# Patient Record
Sex: MALE | Race: BLACK OR AFRICAN AMERICAN | NOT HISPANIC OR LATINO | Employment: FULL TIME | URBAN - METROPOLITAN AREA
[De-identification: names, ages, dates, MRNs, and addresses within clinical notes are randomized per-mention and may not be internally consistent; named-entity substitution may affect disease eponyms.]

---

## 2017-04-06 ENCOUNTER — HOSPITAL ENCOUNTER (OUTPATIENT)
Dept: RADIOLOGY | Facility: HOSPITAL | Age: 42
Discharge: HOME | End: 2017-04-06
Attending: PODIATRIST
Payer: COMMERCIAL

## 2017-04-06 ENCOUNTER — OFFICE VISIT (OUTPATIENT)
Dept: PODIATRY | Facility: CLINIC | Age: 42
End: 2017-04-06
Payer: COMMERCIAL

## 2017-04-06 VITALS
SYSTOLIC BLOOD PRESSURE: 135 MMHG | HEIGHT: 72 IN | DIASTOLIC BLOOD PRESSURE: 85 MMHG | HEART RATE: 86 BPM | BODY MASS INDEX: 37.32 KG/M2 | WEIGHT: 275.56 LBS

## 2017-04-06 DIAGNOSIS — M76.821 POSTERIOR TIBIAL TENDINITIS, RIGHT: Primary | ICD-10-CM

## 2017-04-06 DIAGNOSIS — M21.41 PES PLANUS OF BOTH FEET: ICD-10-CM

## 2017-04-06 DIAGNOSIS — M25.571 RIGHT ANKLE PAIN, UNSPECIFIED CHRONICITY: ICD-10-CM

## 2017-04-06 DIAGNOSIS — M25.571 RIGHT ANKLE PAIN, UNSPECIFIED CHRONICITY: Primary | ICD-10-CM

## 2017-04-06 DIAGNOSIS — M21.42 PES PLANUS OF BOTH FEET: ICD-10-CM

## 2017-04-06 PROCEDURE — 73610 X-RAY EXAM OF ANKLE: CPT | Mod: 26,RT,, | Performed by: RADIOLOGY

## 2017-04-06 PROCEDURE — 99204 OFFICE O/P NEW MOD 45 MIN: CPT | Mod: S$GLB,,, | Performed by: PODIATRIST

## 2017-04-06 PROCEDURE — 99999 PR PBB SHADOW E&M-EST. PATIENT-LVL III: CPT | Mod: PBBFAC,,, | Performed by: PODIATRIST

## 2017-04-06 PROCEDURE — 73610 X-RAY EXAM OF ANKLE: CPT | Mod: TC,PO,RT

## 2017-04-06 PROCEDURE — 1160F RVW MEDS BY RX/DR IN RCRD: CPT | Mod: S$GLB,,, | Performed by: PODIATRIST

## 2017-04-06 RX ORDER — METHYLPREDNISOLONE 4 MG/1
4 TABLET ORAL DAILY
Qty: 1 PACKAGE | Refills: 0 | Status: SHIPPED | OUTPATIENT
Start: 2017-04-06 | End: 2017-05-24

## 2017-04-06 NOTE — MR AVS SNAPSHOT
[facility]  [address] [place] [address]  Phone: [phone]  Fax: [phone]                  Oscar Lemus   2017 3:00 PM   Office Visit    Description:  Male : 1975   Provider:  Rose Purdy DPM   Department:  [facility]           Reason for Visit     Ankle Pain           Diagnoses this Visit        Comments    Posterior tibial tendinitis, right    -  Primary            To Do List           Future Appointments        Provider Department Dept Phone    2017  4:30 PM [facility] XR5 [facility] [phone]    2017 2:40 PM [facility] [phone]      Goals (5 Years of Data)     None       These Medications        Disp Refills Start End    methylPREDNISolone (MEDROL DOSEPACK) 4 mg tablet 1 Package 0 2017     Take 1 tablet (4 mg total) by mouth once daily. Use as instructed on dose pack - Oral    Pharmacy: [facility] [id number]  [place], [place] - [address] [address] AT [facility] [facility] Ph #: [phone]         [facility]     [facility] Nurse Care Line -  Assistance  Unless otherwise directed by your provider, please contact [facility], our nurse care line that is available for  assistance.     Registered nurses in the [facility] provide: appointment scheduling, clinical advisement, health education, and other advisory services.  Call: [phone] (toll free)               Medications           Message regarding Medications     Verify the changes and/or additions to your medication regime listed below are the same as discussed with your clinician today.  If any of these changes or additions are incorrect, please notify your healthcare provider.        START taking these NEW medications        Refills    methylPREDNISolone (MEDROL DOSEPACK) 4 mg tablet 0    Sig: Take 1 tablet (4 mg total) by mouth once daily. Use as instructed on dose pack    Class: Normal     Route: Oral           Verify that the below list of medications is an accurate representation of the medications you are currently taking.  If none reported, the list may be blank. If incorrect, please contact your healthcare provider. Carry this list with you in case of emergency.           Current Medications     methylPREDNISolone (MEDROL DOSEPACK) 4 mg tablet Take 1 tablet (4 mg total) by mouth once daily. Use as instructed on dose pack           Clinical Reference Information           Your Vitals Were     BP Pulse Height Weight BMI    135/85 (BP Location: Right arm, Patient Position: Sitting, BP Method: Automatic) 86 6' (1.829 m) 125 kg (275 lb 9.2 oz) 37.37 kg/m2      Blood Pressure          Most Recent Value    BP  135/85      Allergies as of 4/6/2017     Cephalosporins    Pcn [Penicillins]    Rocephin [Ceftriaxone]    Codeine    Ibuprofen      Immunizations Administered on Date of Encounter - 4/6/2017     None      [facility] Sign-Up     Activating your [facility] account is as easy as 1-2-3!     1) Visit [email or web address], select Sign Up Now, enter this activation code and your date of birth, then select Next.  [email or web address]  Expires: 5/21/2017  4:29 PM      2) Create a username and password to use when you visit [facility] in the future and select a security question in case you lose your password and select Next.    3) Enter your e-mail address and click Sign Up!    Additional Information  If you have questions, please e-mail [email or web address] or call [phone] to talk to our [facility] staff. Remember, [facility] is NOT to be used for urgent needs. For medical emergencies, dial 911.         Language Assistance Services     ATTENTION: Language assistance services are available, free of charge. Please call [phone].      ATENCIÓN: Si albala heraclio, tiene a henry disposición servicios gratuitos de asistencia lingüística. Llame al [phone].     CHÚ Ý: N?u b?n nói Ti?ng Vi?t, có các d?ch v? h?  tr? erica jarvis? mi?n phí dành cho b?n. G?i s? [phone].         [facility] complies with applicable Federal civil rights laws and does not discriminate on the basis of race, color, national origin, age, disability, or sex.

## 2017-04-06 NOTE — PROGRESS NOTES
Subjective:     Patient ID: Oscar Lemus is a 41 y.o. male.    Chief Complaint: Ankle Pain (Right ankle pain (medial side/there is swelling-no injury). States no current pain but when the pain does occur it throbs.)    Oscar is a 41 y.o. male who presents to the podiatry clinic  with complaint of  right foot pain. Onset of the symptoms was several weeks ago. Precipitating event: none known. Current symptoms include: ability to bear weight, but with some pain. Aggravating factors: running, squatting, standing and walking. Symptoms have gradually worsened. Patient has had no prior foot problems. Evaluation to date: none. Treatment to date: none. Patients rates pain 3/10 on pain scale. 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       There is no problem list on file for this patient.      Medication List with Changes/Refills   New Medications    METHYLPREDNISOLONE (MEDROL DOSEPACK) 4 MG TABLET    Take 1 tablet (4 mg total) by mouth once daily. Use as instructed on dose pack       Review of patient's allergies indicates:   Allergen Reactions    Cephalosporins Anaphylaxis    Pcn [penicillins] Anaphylaxis    Rocephin [ceftriaxone] Anaphylaxis    Codeine      HALLUCINATES    Ibuprofen Other (See Comments)       Past Surgical History:   Procedure Laterality Date    KNEE ARTHROSCOPY Right        History reviewed. No pertinent family history.    Social History     Social History    Marital status:      Spouse name: N/A    Number of children: N/A    Years of education: N/A     Occupational History    Not on file.     Social History Main Topics    Smoking status: Former Smoker     Types: Cigars    Smokeless tobacco: Not on file      Comment: Cigar smoker (previously)    Alcohol use Not on file    Drug use: Not on file    Sexual activity: Not on file     Other Topics Concern    Not on file     Social History Narrative       Vitals:     04/06/17 1544   BP: 135/85   Pulse: 86   Weight: 125 kg (275 lb 9.2 oz)   Height: 6' (1.829 m)   PainSc: 0-No pain       Review of Systems   Constitutional: Negative for chills and fever.   Respiratory: Negative for shortness of breath.    Cardiovascular: Negative for chest pain, palpitations, orthopnea, claudication and leg swelling.   Gastrointestinal: Negative for diarrhea, nausea and vomiting.   Musculoskeletal: Negative for joint pain.   Skin: Negative for rash.   Neurological: Negative for dizziness, tingling, sensory change, focal weakness and weakness.   Psychiatric/Behavioral: Negative.              Objective:       PHYSICAL EXAM: Apperance: Alert and orient in no distress,well developed, and with good attention to grooming and body habits  Patient presents ambulating in tennis shoes  Lower Extremity Physical Exam:  VASCULAR: Dorsalis pedis pulses 2/4 bilateral and Posterior Tibial pulses 2/4 bilateral. Capillary fill time <4 seconds bilateral. Mild edema observed right. Varicosities absent bilateral. Skin temperature of the lower extremities is warm to warm, proximal to distal. Hair growth WNL bilateral.  DERMATOLOGICAL: No skin rashes, subcutaneous nodules, lesions, or ulcers observed bilateral.  NEUROLOGICAL: Light touch, sharp-dull, proprioception all present and equal bilaterally.    MUSCULOSKELETAL: Muscle strength is 5/5 for foot inverters, everters, plantarflexors, and dorsiflexors. Muscle tone is normal. (+) tenderness on palpation of right lateral ankle at the ATFL and CFL, and along posterior tibial tendon. Pes planus foot type noted bilateral.     TEST RESULTS: Radiographs of right ankle reveals nonspecific soft tissue edema noted along the medial margins of the ankle.  No acute fractures or dislocations visualized. Ankle mortise is well-maintained. Dorsal calcaneal enthesophyte noted.        Assessment:       Encounter Diagnoses   Name Primary?    Posterior tibial tendinitis, right Yes    Pes  planus of both feet          Plan:   Posterior tibial tendinitis, right  -     methylPREDNISolone (MEDROL DOSEPACK) 4 mg tablet; Take 1 tablet (4 mg total) by mouth once daily. Use as instructed on dose pack  Dispense: 1 Package; Refill: 0    Pes planus of both feet      I counseled the patient on his conditions, their implications and medical management.  Reviewed x-rays in exam room with patient.   The patient and I reviewed the types of shoes he should be wearing, my recommendation includes generally the best time of the day for a shoe fitting is the afternoon, shoes with a wide toe box, very good cushion, and tennis shoes with removable inner soles. The patient and I reviewed my recommendations for over-the-counter orthotic inserts.  Patient was fitted with lace up ankle brace and instructed on proper usage. This should be worn daily for a minimum of 4 weeks at all times when ambulating.  Patient instructed on adequate icing techniques. Patient should ice the affected area at least once per day x 10 minutes for 10 days . I advised the  patient that extra icing would also be beneficial to ensure adequate anti inflammatory effect.   Prescribed Medrol Dosepak to be taken as directed on package. Discussed possible increase in blood sugar with taking steroid medication. Patient advised on the possible elevation of blood pressure sugar and caution to take pills as prescribed and to discontinue use if symptoms arise, patient agreed.  Patient to refrain from any running activities for 4 weeks.   I gave written and verbal instructions on heel cord stretching and this was demonstrated for the patient. Patient expressed understanding.  Patient to return in 1 month.           Rose Purdy DPM  [facility]

## 2017-05-24 ENCOUNTER — OFFICE VISIT (OUTPATIENT)
Dept: PODIATRY | Facility: CLINIC | Age: 42
End: 2017-05-24
Payer: COMMERCIAL

## 2017-05-24 VITALS
WEIGHT: 284.63 LBS | BODY MASS INDEX: 38.55 KG/M2 | HEART RATE: 85 BPM | HEIGHT: 72 IN | DIASTOLIC BLOOD PRESSURE: 88 MMHG | SYSTOLIC BLOOD PRESSURE: 134 MMHG

## 2017-05-24 DIAGNOSIS — M76.821 POSTERIOR TIBIAL TENDON DYSFUNCTION, BILATERAL: Primary | ICD-10-CM

## 2017-05-24 DIAGNOSIS — M21.6X1 ACQUIRED EQUINUS DEFORMITY OF BOTH FEET: ICD-10-CM

## 2017-05-24 DIAGNOSIS — M76.822 POSTERIOR TIBIAL TENDON DYSFUNCTION, BILATERAL: Primary | ICD-10-CM

## 2017-05-24 DIAGNOSIS — M21.42 PES PLANUS OF BOTH FEET: ICD-10-CM

## 2017-05-24 DIAGNOSIS — M21.6X2 ACQUIRED EQUINUS DEFORMITY OF BOTH FEET: ICD-10-CM

## 2017-05-24 DIAGNOSIS — M21.41 PES PLANUS OF BOTH FEET: ICD-10-CM

## 2017-05-24 PROCEDURE — 99999 PR PBB SHADOW E&M-EST. PATIENT-LVL III: CPT | Mod: PBBFAC,,, | Performed by: PODIATRIST

## 2017-05-24 PROCEDURE — 99213 OFFICE O/P EST LOW 20 MIN: CPT | Mod: S$GLB,,, | Performed by: PODIATRIST

## 2017-06-04 NOTE — PROGRESS NOTES
Subjective:     Patient ID: Oscar Lemus is a 42 y.o. male.    Chief Complaint: Foot Problem (posterior tibial tendinitis of the right foot, patient denied current pain )    Oscar is a 42 y.o. male who presents to the podiatry clinic  with complaint of  right foot pain. Patient states the ankle brace has helped a lot. Patient also states the foot is better with the inserts. Patient states he has refrained from exercises.  Onset of the symptoms was several weeks ago. Precipitating event: none known. Current symptoms include: ability to bear weight, but with some pain. Aggravating factors: running, squatting, standing and walking. Symptoms have gradually worsened. Patient has had no prior foot problems. Evaluation to date: none. Treatment to date: none. Patients rates pain 0/10 on pain scale. 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       There is no problem list on file for this patient.      Medication List with Changes/Refills   Discontinued Medications    METHYLPREDNISOLONE (MEDROL DOSEPACK) 4 MG TABLET    Take 1 tablet (4 mg total) by mouth once daily. Use as instructed on dose pack       Review of patient's allergies indicates:   Allergen Reactions    Cephalosporins Anaphylaxis    Pcn [penicillins] Anaphylaxis    Rocephin [ceftriaxone] Anaphylaxis    Codeine      HALLUCINATES    Ibuprofen Other (See Comments)       Past Surgical History:   Procedure Laterality Date    KNEE ARTHROSCOPY Right        History reviewed. No pertinent family history.    Social History     Social History    Marital status:      Spouse name: N/A    Number of children: N/A    Years of education: N/A     Occupational History    Not on file.     Social History Main Topics    Smoking status: Former Smoker     Types: Cigars    Smokeless tobacco: Not on file      Comment: Cigar smoker (previously)    Alcohol use Not on file    Drug use: Unknown     Sexual activity: Not on file     Other Topics Concern    Not on file     Social History Narrative    No narrative on file       Vitals:    05/24/17 1445   BP: 134/88   Pulse: 85   Weight: 129.1 kg (284 lb 9.8 oz)   Height: 6' (1.829 m)   PainSc: 0-No pain       Review of Systems   Constitutional: Negative for chills and fever.   Respiratory: Negative for shortness of breath.    Cardiovascular: Negative for chest pain, palpitations, orthopnea, claudication and leg swelling.   Gastrointestinal: Negative for diarrhea, nausea and vomiting.   Musculoskeletal: Negative for joint pain.   Skin: Negative for rash.   Neurological: Negative for dizziness, tingling, sensory change, focal weakness and weakness.   Psychiatric/Behavioral: Negative.              Objective:       PHYSICAL EXAM: Apperance: Alert and orient in no distress,well developed, and with good attention to grooming and body habits  Patient presents ambulating in tennis shoes  Lower Extremity Physical Exam:  VASCULAR: Dorsalis pedis pulses 2/4 bilateral and Posterior Tibial pulses 2/4 bilateral. Capillary fill time <4 seconds bilateral. Mild edema observed right. Varicosities absent bilateral. Skin temperature of the lower extremities is warm to warm, proximal to distal. Hair growth WNL bilateral.  DERMATOLOGICAL: No skin rashes, subcutaneous nodules, lesions, or ulcers observed bilateral.  NEUROLOGICAL: Light touch, sharp-dull, proprioception all present and equal bilaterally.    MUSCULOSKELETAL: Muscle strength is 5/5 for foot inverters, everters, plantarflexors, and dorsiflexors. Muscle tone is normal. (-) tenderness on palpation of right lateral ankle at the ATFL and CFL, and along posterior tibial tendon. Pes planus foot type noted bilateral.     TEST RESULTS: Radiographs of right ankle reveals nonspecific soft tissue edema noted along the medial margins of the ankle.  No acute fractures or dislocations visualized. Ankle mortise is well-maintained.  Dorsal calcaneal enthesophyte noted.        Assessment:       Encounter Diagnoses   Name Primary?    Posterior tibial tendon dysfunction, bilateral Yes    Pes planus of both feet     Acquired equinus deformity of both feet          Plan:   Posterior tibial tendon dysfunction, bilateral  -     ORTHOTIC DEVICE (DME)    Pes planus of both feet  -     ORTHOTIC DEVICE (DME)    Acquired equinus deformity of both feet  -     ORTHOTIC DEVICE (DME)      I counseled the patient on his conditions, their implications and medical management.  Prescription written for custom orthotic inserts.   Patient instructed to continue to use ankle brace as needed.   Patient to gradually return to any running activities for 4 weeks.   I gave written and verbal instructions on heel cord stretching and this was demonstrated for the patient. Patient expressed understanding.  Patient to return as needed.             Rose Purdy DPM  [facility]

## 2019-08-26 ENCOUNTER — HOSPITAL ENCOUNTER (OUTPATIENT)
Dept: RADIOLOGY | Facility: HOSPITAL | Age: 44
Discharge: HOME | End: 2019-08-26
Attending: FAMILY MEDICINE
Payer: OTHER GOVERNMENT

## 2019-08-26 ENCOUNTER — OFFICE VISIT (OUTPATIENT)
Dept: INTERNAL MEDICINE | Facility: CLINIC | Age: 44
End: 2019-08-26
Payer: OTHER GOVERNMENT

## 2019-08-26 VITALS
HEIGHT: 72 IN | BODY MASS INDEX: 38.61 KG/M2 | SYSTOLIC BLOOD PRESSURE: 118 MMHG | WEIGHT: 285.06 LBS | DIASTOLIC BLOOD PRESSURE: 88 MMHG | OXYGEN SATURATION: 97 % | TEMPERATURE: 98 F | HEART RATE: 83 BPM | RESPIRATION RATE: 16 BRPM

## 2019-08-26 DIAGNOSIS — M25.511 CHRONIC RIGHT SHOULDER PAIN: ICD-10-CM

## 2019-08-26 DIAGNOSIS — M25.511 CHRONIC RIGHT SHOULDER PAIN: Primary | ICD-10-CM

## 2019-08-26 DIAGNOSIS — G89.29 CHRONIC RIGHT SHOULDER PAIN: Primary | ICD-10-CM

## 2019-08-26 DIAGNOSIS — G89.29 CHRONIC RIGHT SHOULDER PAIN: ICD-10-CM

## 2019-08-26 PROCEDURE — 73030 X-RAY EXAM OF SHOULDER: CPT | Mod: 26,RT,, | Performed by: RADIOLOGY

## 2019-08-26 PROCEDURE — 99999 PR PBB SHADOW E&M-EST. PATIENT-LVL IV: CPT | Mod: PBBFAC,,, | Performed by: FAMILY MEDICINE

## 2019-08-26 PROCEDURE — 99214 OFFICE O/P EST MOD 30 MIN: CPT | Mod: PBBFAC,25 | Performed by: FAMILY MEDICINE

## 2019-08-26 PROCEDURE — 99999 PR PBB SHADOW E&M-EST. PATIENT-LVL IV: ICD-10-PCS | Mod: PBBFAC,,, | Performed by: FAMILY MEDICINE

## 2019-08-26 PROCEDURE — 99203 OFFICE O/P NEW LOW 30 MIN: CPT | Mod: S$PBB,,, | Performed by: FAMILY MEDICINE

## 2019-08-26 PROCEDURE — 99203 PR OFFICE/OUTPT VISIT, NEW, LEVL III, 30-44 MIN: ICD-10-PCS | Mod: S$PBB,,, | Performed by: FAMILY MEDICINE

## 2019-08-26 PROCEDURE — 73030 XR SHOULDER COMPLETE 2 OR MORE VIEWS RIGHT: ICD-10-PCS | Mod: 26,RT,, | Performed by: RADIOLOGY

## 2019-08-26 PROCEDURE — 73030 X-RAY EXAM OF SHOULDER: CPT | Mod: TC,RT

## 2019-08-26 RX ORDER — ACETAMINOPHEN 500 MG
500 TABLET ORAL
COMMUNITY

## 2019-08-26 NOTE — PROGRESS NOTES
Subjective:   Patient ID: Oscar Lemus is a 44 y.o. male.  Chief Complaint:  Shoulder Injury ([facility] Eval Referral, Rt 06/2019)    Staff SGYOCASTA Grullon appears for his appointment as scheduled by the [employer] following injury during combat exercises June 11.  He has been seen by [facility] doc immediately after the injury, whom diagnosed AC separation on Xray.  He was given a sling and tylenol for pain relief.  He stopped the sling after 1 month and controls the pain flare ups with tylenol.  He is in need of Return to Work certification for his job as .      Shoulder Pain    The pain is present in the right shoulder. This is a chronic problem. The current episode started more than 1 month ago (June 11 2019 during combat excercise). There has been a history of trauma (Stretch injury during fall while holding on to support bar. [facility] Doc tooks x-rays and diagnosed AC Joint separation.). The problem occurs daily. The problem has been gradually improving (No longer needing sling). The quality of the pain is described as aching and sharp (Dull ache daily, Sharp when aggrivated). The pain is at a severity of 6/10. The pain is moderate. Associated symptoms include an inability to bear weight, stiffness and tingling. Pertinent negatives include no fever, headaches, itching, joint locking, joint swelling, limited range of motion or numbness. The symptoms are aggravated by activity. He has tried acetaminophen and rest for the symptoms. The treatment provided mild relief. Family history includes arthritis. Osteoarthritis There is no history of diabetes.       Current Outpatient Medications:     acetaminophen (TYLENOL) 500 MG tablet, Take 500 mg by mouth as needed for Pain., Disp: , Rfl:     Review of Systems   Constitutional: Positive for activity change. Negative for appetite change, chills, diaphoresis, fatigue, fever and unexpected weight change.        Activity limited due to pain and injury   HENT: Negative  for facial swelling, hearing loss, sore throat and trouble swallowing.    Eyes: Negative for redness and visual disturbance.   Respiratory: Negative for chest tightness, shortness of breath, wheezing and stridor.    Cardiovascular: Negative for chest pain and palpitations.   Gastrointestinal: Negative for abdominal pain, constipation, diarrhea, nausea and vomiting.   Endocrine: Negative for cold intolerance and heat intolerance.   Genitourinary: Negative for difficulty urinating, flank pain and hematuria.   Musculoskeletal: Positive for arthralgias, joint swelling, myalgias, neck pain and stiffness. Negative for back pain, gait problem and neck stiffness.        Mild swelling of Right shoulder in the days following injury.  Pain localized to the joint, pectorals, deltoid, and bicep.  Neck pain on bad days when he has aggravated the shoulder.   Skin: Negative for color change, itching, pallor, rash and wound.   Allergic/Immunologic: Negative for immunocompromised state.   Neurological: Positive for tingling and syncope. Negative for dizziness, tremors, facial asymmetry, speech difficulty, weakness, light-headedness, numbness and headaches.        Lost consciousness due to pain in the minutes following injury, and once again later that day.  Has not happened before or since.   Psychiatric/Behavioral: Negative for agitation, behavioral problems and confusion. The patient is not nervous/anxious.      Objective:   /88 (BP Location: Left arm, Patient Position: Sitting, BP Method: Large (Manual))   Pulse 83   Temp 98.2 °F (36.8 °C) (Tympanic)   Resp 16   Ht 6' (1.829 m)   Wt 129.3 kg (285 lb 0.9 oz)   SpO2 97%   BMI 38.66 kg/m²     Physical Exam   Constitutional: He appears well-developed and well-nourished. No distress.   HENT:   Head: Normocephalic and atraumatic.   Eyes: Pupils are equal, round, and reactive to light. Conjunctivae are normal.   Neck: Normal range of motion. Neck supple. No JVD present. No  tracheal deviation present. No thyromegaly present.   Cardiovascular: Normal rate, regular rhythm, normal heart sounds and intact distal pulses.   No murmur heard.  Pulmonary/Chest: Effort normal and breath sounds normal.   Abdominal: Soft. Bowel sounds are normal.   Musculoskeletal: He exhibits tenderness. He exhibits no edema.        Right shoulder: He exhibits tenderness, bony tenderness, crepitus, deformity and pain. He exhibits normal range of motion, no swelling, no effusion, no laceration, no spasm, normal pulse and normal strength.   Right Shoulder Active/Passive ROM intact and non-tender.  Apley's scratch negative/bilaterally equal  Painful Arc Negative  Biceps tendon non-painful  Reflexes intact  Strength and sensation equal     Maria Eugenia tenderness along AC joint line, slight joint line deformity compared to left side     Beers/Job's test equal both sides  Speeds test negative  Mckinney/Romain Positive - mild pain to AC joint  Scarf Test Positive - mild pain to pec major  Aprehension test positive.  Drop Arm negative  Interior/exterior lag tests negative.   Lymphadenopathy:     He has no cervical adenopathy.   Neurological: He is alert. He displays normal reflexes. No sensory deficit. He exhibits normal muscle tone. Coordination normal.   Skin: Skin is warm and dry. He is not diaphoretic.   Psychiatric: He has a normal mood and affect. His behavior is normal.   Nursing note and vitals reviewed.    Assessment:       ICD-10-CM ICD-9-CM   1. Chronic right shoulder pain M25.511 719.41    G89.29 338.29     Plan:   Chronic right shoulder pain  -     X-ray Shoulder 2 or More Views Right; Future; Expected date: 08/26/2019  X-ray shows with mild AC joint arthropathy.  Glenohumeral joint normal.  Not enough to explain chronicity and symptoms.    Exam suspicious for rotator cuff impingement or tear.    Will order MRI.    Additional treatment recommendations to follow.    I hereby acknowledge that I am relying upon  documentation authored by a medical student working under my supervision and further I hereby attest that I have verified the student documentation or findings by personally re-performing the physical exam and medical decision making activities of the Evaluation and Management service to be billed.  Jeramy Carlisle

## 2019-08-27 DIAGNOSIS — M25.511 CHRONIC RIGHT SHOULDER PAIN: Primary | ICD-10-CM

## 2019-08-27 DIAGNOSIS — G89.29 CHRONIC RIGHT SHOULDER PAIN: Primary | ICD-10-CM

## 2019-08-30 ENCOUNTER — TELEPHONE (OUTPATIENT)
Dept: RADIOLOGY | Facility: HOSPITAL | Age: 44
End: 2019-08-30

## 2019-09-03 ENCOUNTER — HOSPITAL ENCOUNTER (OUTPATIENT)
Dept: RADIOLOGY | Facility: HOSPITAL | Age: 44
Discharge: HOME | End: 2019-09-03
Attending: FAMILY MEDICINE
Payer: OTHER MISCELLANEOUS

## 2019-09-03 DIAGNOSIS — G89.29 CHRONIC RIGHT SHOULDER PAIN: ICD-10-CM

## 2019-09-03 DIAGNOSIS — M25.511 CHRONIC RIGHT SHOULDER PAIN: ICD-10-CM

## 2019-09-03 PROCEDURE — 73221 MRI SHOULDER WITHOUT CONTRAST RIGHT: ICD-10-PCS | Mod: 26,RT,, | Performed by: RADIOLOGY

## 2019-09-03 PROCEDURE — 73221 MRI JOINT UPR EXTREM W/O DYE: CPT | Mod: 26,RT,, | Performed by: RADIOLOGY

## 2019-09-03 PROCEDURE — 73221 MRI JOINT UPR EXTREM W/O DYE: CPT | Mod: TC,RT

## 2019-09-06 ENCOUNTER — TELEPHONE (OUTPATIENT)
Dept: INTERNAL MEDICINE | Facility: CLINIC | Age: 44
End: 2019-09-06

## 2019-09-06 ENCOUNTER — TELEPHONE (OUTPATIENT)
Dept: ORTHOPEDICS | Facility: CLINIC | Age: 44
End: 2019-09-06

## 2019-09-06 DIAGNOSIS — G89.29 CHRONIC RIGHT SHOULDER PAIN: Primary | ICD-10-CM

## 2019-09-06 DIAGNOSIS — M25.511 RIGHT SHOULDER PAIN, UNSPECIFIED CHRONICITY: Primary | ICD-10-CM

## 2019-09-06 DIAGNOSIS — M25.511 CHRONIC RIGHT SHOULDER PAIN: Primary | ICD-10-CM

## 2019-09-06 NOTE — TELEPHONE ENCOUNTER
----- Message from Winnie Alvarado sent at 9/6/2019 12:54 PM CDT -----  Contact: Patient   Type:  Patient Returning Call    Who Called: Oscar  Who Left Message for Patient: Ita  Does the patient know what this is regarding?: Not sure  Would the patient rather a call back or a response via [facility]? Call back  Best Call Back Number: Please call him at [phone]  Additional Information: n/a

## 2019-09-06 NOTE — TELEPHONE ENCOUNTER
----- Message from Елена Saunders sent at 9/6/2019 12:25 PM CDT -----  Contact: patient   Pt does not have a preferred orthopedic.Please call back if needed on [phone].    Thanks,  Елена Saunders

## 2019-09-06 NOTE — TELEPHONE ENCOUNTER
Spoke c pt. Informed pt that 1 additional x-ray needs to be taken prior to appt. Advised pt to arrive for x-ray appt 30 minutes prior to scheduled appt c Dr. Brock. Pt expressed understanding & was thankful.

## 2019-09-06 NOTE — TELEPHONE ENCOUNTER
----- Message from Neha White sent at 9/5/2019  4:14 PM CDT -----  Contact: Todd Queen- [employer] Gómez  Stated she calling for notes from pt MRI on 09/03 copy of the orders so that it can be paid fax [phone], she can be reached at [phone] Thanks

## 2019-09-09 ENCOUNTER — HOSPITAL ENCOUNTER (OUTPATIENT)
Dept: RADIOLOGY | Facility: HOSPITAL | Age: 44
Discharge: HOME | End: 2019-09-09
Attending: ORTHOPAEDIC SURGERY
Payer: OTHER MISCELLANEOUS

## 2019-09-09 ENCOUNTER — OFFICE VISIT (OUTPATIENT)
Dept: ORTHOPEDICS | Facility: CLINIC | Age: 44
End: 2019-09-09
Payer: OTHER MISCELLANEOUS

## 2019-09-09 VITALS
SYSTOLIC BLOOD PRESSURE: 135 MMHG | HEART RATE: 86 BPM | BODY MASS INDEX: 38.6 KG/M2 | DIASTOLIC BLOOD PRESSURE: 78 MMHG | WEIGHT: 285 LBS | HEIGHT: 72 IN

## 2019-09-09 DIAGNOSIS — G89.29 CHRONIC RIGHT SHOULDER PAIN: ICD-10-CM

## 2019-09-09 DIAGNOSIS — M75.21 TENDONITIS OF UPPER BICEPS TENDON OF RIGHT SHOULDER: ICD-10-CM

## 2019-09-09 DIAGNOSIS — M25.511 CHRONIC RIGHT SHOULDER PAIN: ICD-10-CM

## 2019-09-09 DIAGNOSIS — M25.511 RIGHT SHOULDER PAIN, UNSPECIFIED CHRONICITY: ICD-10-CM

## 2019-09-09 DIAGNOSIS — M25.311 INSTABILITY OF RIGHT SHOULDER JOINT: Primary | ICD-10-CM

## 2019-09-09 PROCEDURE — 99999 PR PBB SHADOW E&M-EST. PATIENT-LVL III: ICD-10-PCS | Mod: PBBFAC,,, | Performed by: ORTHOPAEDIC SURGERY

## 2019-09-09 PROCEDURE — 73020 X-RAY EXAM OF SHOULDER: CPT | Mod: TC,RT

## 2019-09-09 PROCEDURE — 99999 PR PBB SHADOW E&M-EST. PATIENT-LVL III: CPT | Mod: PBBFAC,,, | Performed by: ORTHOPAEDIC SURGERY

## 2019-09-09 PROCEDURE — 73020 XR SHOULDER 1 VIEW RIGHT: ICD-10-PCS | Mod: 26,RT,, | Performed by: RADIOLOGY

## 2019-09-09 PROCEDURE — 99244 PR OFFICE CONSULTATION,LEVEL IV: ICD-10-PCS | Mod: S$GLB,,, | Performed by: ORTHOPAEDIC SURGERY

## 2019-09-09 PROCEDURE — 99244 OFF/OP CNSLTJ NEW/EST MOD 40: CPT | Mod: S$GLB,,, | Performed by: ORTHOPAEDIC SURGERY

## 2019-09-09 PROCEDURE — 73020 X-RAY EXAM OF SHOULDER: CPT | Mod: 26,RT,, | Performed by: RADIOLOGY

## 2019-09-09 NOTE — PROGRESS NOTES
Subjective:     Patient ID: Oscar Lemus is a 44 y.o. male.    Chief Complaint: Pain of the Right Shoulder    Consult from Dr. Jeramy Carlisle  will receive report electronically     Oscar Lemus, a 44 y.o. RHD male, presents today for evaluation of his RIGHT shoulder. 06/11/19 slipped and fell while participating in a [employer] training activity, shoulder was anteriorly dislocated and reduced by medic on site. Denies numbness, tingling, burning, radiating pain. Pain with ADLs. No prior trauma to right upper extremity.  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 [employer].            Shoulder Pain    The pain is present in the right shoulder. This is a new problem. The current episode started more than 1 month ago. There has been a history of trauma. The injury was the result of a falling action while at work. The problem occurs daily. The problem has been gradually improving. The quality of the pain is described as aching. The pain is at a severity of 4/10. Pertinent negatives include no fever or numbness. The symptoms are aggravated by activity. He has tried exercise for the symptoms. The treatment provided mild relief. Physical therapy was not tried.      Past Medical History:   Diagnosis Date    History of  deployment      Past Surgical History:   Procedure Laterality Date    KNEE ARTHROSCOPY Right     TONSILLECTOMY       Family History   Problem Relation Age of Onset    Arrhythmia Mother         Pacemaker    Diabetes Father     Hypertension Father     Hypertension Brother     Lung cancer Maternal Grandfather         + Smoker    Asbestos Maternal Grandfather      Social History     Socioeconomic History    Marital status:      Spouse name: Not on file    Number of children: Not on file    Years of education: Not on file    Highest education level: Not on file   Occupational History     Not on file   Social Needs    Financial resource strain: Not on file    Food insecurity:     Worry: Not on file     Inability: Not on file    Transportation needs:     Medical: Not on file     Non-medical: Not on file   Tobacco Use    Smoking status: Former Smoker     Types: Cigars    Smokeless tobacco: Current User     Types: Chew    Tobacco comment: Current dip tobacco user, average 2 cans per week.   Substance and Sexual Activity    Alcohol use: Yes     Frequency: 2-4 times a month     Drinks per session: 1 or 2    Drug use: Never    Sexual activity: Not on file   Lifestyle    Physical activity:     Days per week: Not on file     Minutes per session: Not on file    Stress: Not on file   Relationships    Social connections:     Talks on phone: Not on file     Gets together: Not on file     Attends [employer] service: Not on file     Active member of club or organization: Not on file     Attends meetings of clubs or organizations: Not on file     Relationship status: Not on file   Other Topics Concern    Not on file   Social History Narrative    [employer], Current [employer]; no smokers in household, +dogs.     Medication List with Changes/Refills   Current Medications    ACETAMINOPHEN (TYLENOL) 500 MG TABLET    Take 500 mg by mouth as needed for Pain.     Review of patient's allergies indicates:   Allergen Reactions    Cephalosporins Anaphylaxis    Ibuprofen Other (See Comments)     Acute Renal Failure.    Pcn [penicillins] Anaphylaxis    Rocephin [ceftriaxone] Anaphylaxis    Beets [red beet (beta vulgaris)] Nausea And Vomiting     Severe emesis.    Codeine Other (See Comments)     HALLUCINATES     Review of Systems   Constitution: Negative for chills and fever.   HENT: Negative for ear discharge and hearing loss.    Eyes: Negative for blurred vision and visual disturbance.   Cardiovascular: Negative for chest pain and leg swelling.   Respiratory: Negative for cough and shortness of breath.     Endocrine: Negative for polyuria.   Hematologic/Lymphatic: Negative for bleeding problem.   Skin: Negative for rash.   Musculoskeletal: Positive for joint pain. Negative for back pain, joint swelling, muscle cramps and muscle weakness.   Gastrointestinal: Negative for nausea and vomiting.   Genitourinary: Negative for hematuria.   Neurological: Negative for loss of balance, numbness and paresthesias.   Psychiatric/Behavioral: Negative for altered mental status.       Objective:   Body mass index is 38.65 kg/m².  Vitals:    09/09/19 0957   BP: 135/78   Pulse: 86                General    Vitals reviewed.  Constitutional: He is oriented to person, place, and time. He appears well-developed and well-nourished. No distress.   HENT:   Head: Atraumatic.   Nose: Nose normal.   Eyes: Pupils are equal, round, and reactive to light. Right eye exhibits no discharge. Left eye exhibits no discharge.   Neck: Normal range of motion.   Cardiovascular: Normal rate and intact distal pulses.    Pulmonary/Chest: Effort normal. No respiratory distress.   Neurological: He is alert and oriented to person, place, and time. He has normal reflexes. He displays normal reflexes. No cranial nerve deficit. Coordination normal.   Psychiatric: He has a normal mood and affect. His behavior is normal. Judgment and thought content normal.         Right Shoulder Exam     Inspection/Observation   Swelling: absent  Bruising: absent  Scars: absent  Deformity: absent  Scapular Winging: absent  Scapular Dyskinesia: negative  Atrophy: absent    Tenderness   The patient is tender to palpation of the biceps tendon.    Range of Motion   Active abduction: 90   Passive abduction: 100   Extension: 0   Forward Flexion: 180   Forward Elevation: 180Adduction: 40   External Rotation 0 degrees: 50   Internal rotation 0 degrees: T8     Tests & Signs   Apprehension: positive  Drop arm: negative  Mckinney test: positive  Impingement: positive  Rotator Cuff Painful  Arc/Range: mild  Lift Off Sign: negative  Active Compression Test (Cleaton's Sign): positive  Speed's Test: positive    Other   Sensation: normal    Left Shoulder Exam     Inspection/Observation   Swelling: absent  Bruising: absent  Scars: absent  Deformity: absent  Scapular Winging: absent  Scapular Dyskinesia: negative  Atrophy: absent    Tenderness   The patient is experiencing no tenderness.     Range of Motion   Active abduction: 90   Passive abduction: 100   Extension: 0   Forward Flexion: 180   Forward Elevation: 180Adduction: 40   External Rotation 0 degrees: 50   Internal rotation 0 degrees: T8     Tests & Signs   Drop arm: negative  Mckinney test: negative  Impingement: negative  Lift Off Sign: negative  Active Compression test (Cleaton's Sign): negative  Speed's Test: negative  Bear Hug: negative    Other   Sensation: normal       Muscle Strength   Right Upper Extremity   Shoulder Abduction: 5/5   Shoulder Internal Rotation: 5/5   Shoulder External Rotation: 5/5   Supraspinatus: 5/5/5   Subscapularis: 5/5/5   Biceps: 5/5/5   Left Upper Extremity  Shoulder Abduction: 5/5   Shoulder Internal Rotation: 5/5   Shoulder External Rotation: 5/5   Supraspinatus: 5/5/5   Subscapularis: 5/5/5   Biceps: 5/5/5     Reflexes     Left Side  Biceps:  2+  Triceps:  2+    Right Side   Biceps:  2+  Triceps:  2+    Vascular Exam     Right Pulses      Radial:                    2+      Left Pulses      Radial:                    2+      Capillary Refill  Right Hand: normal capillary refill  Left Hand: normal capillary refill      Relevant imaging results reviewed and interpreted by me, discussed with the patient and / or family today     Reading Physician Reading Date Result Priority   Yuniel Ervin MD 9/3/2019 Routine      Narrative     EXAMINATION:  MRI SHOULDER WITHOUT CONTRAST RIGHT    CLINICAL HISTORY:  Shoulder pain, rotator cuff tear/impingement suspected;  Pain in right shoulder    TECHNIQUE:  Multiplanar multislice  images are were performed through the right shoulder.    COMPARISON:  Right shoulder radiographs from 08/26/2019.    FINDINGS:  There are degenerative changes at the acromioclavicular joint.  No fracture or dislocation or marrow edema.  No lateral downsloping of the acromion.  No joint effusion.  No evidence of subdeltoid bursitis.  No atrophy of the rotator cuff muscles.    The long head of the biceps brachii tendon is situated within the bicipital groove.  Biceps anchor appears normal.  Evaluation for labral pathology is limited by lack of contrast/joint distension.  Within this constraint, no gross labral deformity.  No perilabral cyst.    The supraspinatus, infraspinatus, and subscapularis tendons remain intact.      Impression       Degenerative changes at the AC joint.  No rotator cuff tear.      Electronically signed by: Yuniel Ervin MD  Date: 09/03/2019  Time: 10:11     Reading Physician Reading Date Result Priority   Arvind Chaidez DO 8/26/2019 Routine      Narrative     EXAMINATION:  XR SHOULDER COMPLETE 2 OR MORE VIEWS RIGHT    CLINICAL HISTORY:  Pain in right shoulder    TECHNIQUE:  Two or three views of the right shoulder were preformed.    COMPARISON:  None    FINDINGS:  No acute fracture or dislocation.  Mild AC joint arthropathy noted.  No significant degenerative change at the glenohumeral joint.      Impression       1.  As above      Electronically signed by: Arvind Chaidez DO  Date: 08/26/2019  Time: 15:32     Reading Physician Reading Date Result Priority   John Josue III, MD 9/9/2019 Routine      Narrative     EXAMINATION:  XR SHOULDER 1 VIEW RIGHT    CLINICAL HISTORY:  AXILLARY ONLY;  Pain in right shoulder    TECHNIQUE:  Axillary view only.    COMPARISON:  August 26, 2019    FINDINGS:  Axillary view only demonstrates normal articulation at glenohumeral joint.  No soft tissue calcification or radiopaque foreign body.  No grossly evident acute or healing fracture.      Impression        As above.      Electronically signed by: John Josue MD  Date: 09/09/2019  Time: 11:12     Yuniel Ervin MD 9/3/2019 Routine      Narrative     EXAMINATION:  MRI SHOULDER WITHOUT CONTRAST RIGHT    CLINICAL HISTORY:  Shoulder pain, rotator cuff tear/impingement suspected;  Pain in right shoulder    TECHNIQUE:  Multiplanar multislice images are were performed through the right shoulder.    COMPARISON:  Right shoulder radiographs from 08/26/2019.    FINDINGS:  There are degenerative changes at the acromioclavicular joint.  No fracture or dislocation or marrow edema.  No lateral downsloping of the acromion.  No joint effusion.  No evidence of subdeltoid bursitis.  No atrophy of the rotator cuff muscles.    The long head of the biceps brachii tendon is situated within the bicipital groove.  Biceps anchor appears normal.  Evaluation for labral pathology is limited by lack of contrast/joint distension.  Within this constraint, no gross labral deformity.  No perilabral cyst.    The supraspinatus, infraspinatus, and subscapularis tendons remain intact.      Impression       Degenerative changes at the AC joint.  No rotator cuff tear.      Electronically signed by: Yuniel Ervin MD  Date: 09/03/2019  Time: 10:11             Assessment:     Encounter Diagnoses   Name Primary?    Chronic right shoulder pain     Instability of right shoulder joint Yes    Tendonitis of upper biceps tendon of right shoulder         Plan:     We reviewed with Oscar negron, the pathology and natural history of his diagnosis. We had an extensive discussion as to the conservative treatment and management of their condition. We also discussed the variety of treatment options to include medication, physical therapy, diagnostic testing as well as other treatments.The decision was made to go forward with:    -PT/OT for periscapular muscular strengthening.  First time dislocation, MRI negative for cuff tear.  Having some biceps tendonitis, SLAP  tear possible. Unable to take NSAIDs due to allergy/the kidney.  -continue Tylenol  -he will note improvement in pain and function with therapy  -consider steroid injection if any acute flare-up.  If fails conservative treatment, we will repeat with MRA of shoulder to evaluate labrum                  Disclaimer: This note was prepared using a voice recognition system and is likely to have sound alike errors within the text.

## 2019-09-09 NOTE — LETTER
September 9, 2019      Jeramy Carlisle MD  [address] [facility] [address]           [facility]  [address] [facility] [address]  Phone: [phone]  Fax: [phone]          Patient: Oscar Lemus   MR Number: [id number]   YOB: 1975   Date of Visit: 9/9/2019       Dear Dr. Jeramy Carlisle:    Thank you for referring Oscar Lemus to me for evaluation. Attached you will find relevant portions of my assessment and plan of care.    If you have questions, please do not hesitate to call me. I look forward to following Oscar Lemus along with you.    Sincerely,    Bon Brock MD    Enclosure  CC:  No Recipients    If you would like to receive this communication electronically, please contact [email or web address] or [phone] to request more information on [employer] Link access.    For providers and/or their staff who would like to refer a patient to [facility], please contact us through our one-stop-shop provider referral line, [facility], at [phone].    If you feel you have received this communication in error or would no longer like to receive these types of communications, please e-mail [email or web address]

## 2019-09-16 ENCOUNTER — TELEPHONE (OUTPATIENT)
Dept: INTERNAL MEDICINE | Facility: CLINIC | Age: 44
End: 2019-09-16

## 2019-09-23 ENCOUNTER — CLINICAL SUPPORT (OUTPATIENT)
Dept: REHABILITATION | Facility: HOSPITAL | Age: 44
End: 2019-09-23
Attending: ORTHOPAEDIC SURGERY
Payer: COMMERCIAL

## 2019-09-23 DIAGNOSIS — R29.3 POOR POSTURE: ICD-10-CM

## 2019-09-23 DIAGNOSIS — R53.1 DECREASED STRENGTH, ENDURANCE, AND MOBILITY: ICD-10-CM

## 2019-09-23 DIAGNOSIS — Z74.09 DECREASED STRENGTH, ENDURANCE, AND MOBILITY: ICD-10-CM

## 2019-09-23 DIAGNOSIS — R68.89 DECREASED STRENGTH, ENDURANCE, AND MOBILITY: ICD-10-CM

## 2019-09-23 PROCEDURE — 97161 PT EVAL LOW COMPLEX 20 MIN: CPT

## 2019-09-23 NOTE — PLAN OF CARE
"[facility]  Physical Therapy Initial Evaluation    Name: Oscar Lemus  Clinic Number: [id number]    Therapy Diagnosis:   Encounter Diagnoses   Name Primary?    Decreased strength, endurance, and mobility     Poor posture      Physician: Bon Brock, *    Physician Orders: PT Eval and Treat  Medical Diagnosis from Referral: Instability of right shoulder joint  Evaluation Date: 9/23/2019  Authorization Period Expiration: 9/8/2020  Plan of Care Expiration: 11/22/2019  Visit # / Visits authorized: 1/ 1    Time In: 8:30  Time Out: 9:05  Total Billable Time: 35 minutes    Precautions: Standard    Subjective   Date of onset: June 11, 2019  History of current condition - Mitchel reports: that in June he was doing war game exercises with the [employer] in [place]. 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 Pt states that he was climbing inside of the [place] utilizing his left arm to hold onto the weapon and while his right arm was holding on to the Humvee. Pt states that he slipped which resulting in all of the weight of his body and body armor (extra 55 lbs) being transferred through his right shoulder in a hanging position. Pt states that following the incident he initially had pain in his shoulder. Pt states that he now has difficulty sleeping on his right shoulder. Pt states that he has pain throughout his pectorals on the right. When pt performs full horizontal shoulder abduction, he experiences "pops" and his symptoms reduce. Pt states that he sometimes has numbness that is local to the right shoulder.    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 Pt states that if he were currently working as an , if he feels that he would be in quite a bit of pain if he were to perform his job duties. Pt additionally as a hobby enjoys rock climbing for the last 2 years along " with scuba diving and Judo. Pt is unable to perform tasks of Judo currently as this art requires lots of shoulder and hip throwing which he cannot perform with his right shoulder as he has trialed this.      Medical History:   Past Medical History:   Diagnosis Date    History of  deployment        Surgical History:   Oscar Lemus  has a past surgical history that includes Knee arthroscopy (Right) and Tonsillectomy.    Medications:   Oscar has a current medication list which includes the following prescription(s): acetaminophen.    Allergies:   Review of patient's allergies indicates:   Allergen Reactions    Cephalosporins Anaphylaxis    Ibuprofen Other (See Comments)     Acute Renal Failure.    Pcn [penicillins] Anaphylaxis    Rocephin [ceftriaxone] Anaphylaxis    Beets [red beet (beta vulgaris)] Nausea And Vomiting     Severe emesis.    Codeine Other (See Comments)     HALLUCINATES        Imaging  X-Ray (9/9/2019 shoulder): Axillary view only demonstrates normal articulation at glenohumeral joint.  No soft tissue calcification or radiopaque foreign body.  No grossly evident acute or healing fracture.    MRI studies (9/3/2019 shoulder): Degenerative changes at the AC joint.  No rotator cuff tear.    Prior Therapy: Pt has had previous physical therapy for his knee with some success.   Social History: Pt lives with their girlfriend. Pt is , was a bachelor for a few years and is now currently dating his girlfriend who is very involved in his life. Pt warned that her girlfriend might call the therapist about progress with therapy.   Occupation: Pt is active  for 18 months but whenever he goes off of active he is an .   Prior Level of Function: Pt had no pain or instability in his right shoulder.   Current Level of Function: Pt has pain with sleeping. Pt has difficulty performing activities involving his pectorals including his hobbies including rock climbing,   work and judo. Pt has quite a bit of pain when performing push ups. 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     Pain:  Current 0/10, worst 8/10, best 0/10   Location: right shoulder  and chest   Description: Aching and Throbbing  Aggravating Factors: Laying, Lifting and hobbies and work tasks  Easing Factors: ice    Pts goals: is to be back to where he was prior to getting injured.     Objective     Sensation:  Sensation is intact to light touch although frequently has numbness in his right shoulder.   Posture:  Pt presents with postural abnormalities which include: forward head, rounded shoulders  and increased thoracic kyphosis   Palpation: Increased tone and tenderness noted with palpation of right pectoralis major and pectoralis minor. Increased tone noted with palpation of bilateral upper trapezius.   Movement Analysis: Pt has pain when performed all overhead shoulder activities requiring compression or stretching to the pectorals on the right side.     Range of Motion/Strength:     Shoulder Right Left Pain/Dysfunction with Movement Goal   AROM       Flexion (180) 180 180 Quite of bit of pain and pulling 180 no pain   Extension (60) WFL WFL Quite of bit of pain and pulling WFL no pain   Abduction WFL WFL Quite of bit of pain and pulling 180 no pain   IR (70) Limited WFL Quite of bit of pain and pulling 70 B no pain   ER (90) Limited WFL Quite of bit of pain and pulling 90 B no pain   Horizontal Adduction  WFL WFL Quite of bit of pain and tightness WFL no pain     Elbow Right Left Pain/Dysfunction with Movement   AROM      Flexion (150) WFL WFL No pain   Extension (0) WFL WFL No pain     U/E MMT Right Left Pain/Dysfunction with Movement Goal   Shoulder Flexion 4+/5 4+/5 Little bit of pain and discomfort 5/5 no pain   Shoulder Abduction 4+/5 4+/5 Quite a bit of pain and discomfort 5/5 no pain   Shoulder IR 4/5 4/5 Quite a bit of pain and discomfort 5/5 no pain   Shoulder ER  @ 0*  Abduction 4/5 4/5 Quite a bit of pain and discomfort 5/5 no pain   Elbow Flexion  4+/5 4+/5 No pain 5/5 no pain   Elbow Extension 4+/5 4+/5 No pain 5/5 no pain   Rhomboids 3+/5 3+/5 Quite a bit of pain and discomfort 5/5 no pain   Mid Traps 3+/5 3+/5 Quite a bit of pain and discomfort 5/5 no pain   Low Traps 3+/5 3+/5 Quite a bit of pain and discomfort 5/5 no pain   Pectorals (Sternal and Clavicular fibers) 4/5 4/5 Quite a bit of pain and discomfort 5/5 no pain     MUSCLE LENGTH:     Muscle Tested  Right  9/23/2019 Left   9/23/2019 Goal   Pectoralis Minor decreased c pain decreased Normal B    Pectoralis Major decreased c pain decreased Normal B     SPECIAL TESTS:     Right  9/23/2019 Left   9/23/2019 Goal   Crank Test  Positive Negative Negative B    Mckin[place] Romain Positive Negative Negative B      Joint Mobility    Right  Left   GH Distraction Hypo c pain Normal   GH Inferior Glide Hypo c pain Normal   GH Posterior Glide Hypo c pain Normal   GH Anterior Glide Hypo c pain Normal     Function:   THE UPPER EXTREMITY FUNCTIONAL SCALE (UEFS) - The following scores are based on patient reported assessment.      0 = extreme difficulty or unable to perform activity; 1 = quite a bit of difficulty; 2 = moderate difficulty; 3 = a little bit of difficulty; 4 = no difficulty    1 Any of your usual work, housework, or school activities   1/4  2 Your usual hobbies, re creational or sporting activities    0/4  3 Lifting a bag of groceries to waist level      4/4  4 Lifting a bag of groceries above your head     3/4  5 Grooming your hair         4/4  6 Pushing up on your hands (eg from bathtub or chair)    3/4  7 Preparing food (eg peeling, cutting)      4/4  8 Driving          4/4  9 Vacuuming, sweeping or raking       3/4  10 Dressing          4/4  11 Doing up buttons         4/4  12 Using tools or appliances        1/4  13 Opening doors         4/4  14 Cleaning          4/4  15 Tying or lacing shoes        4/4  16 Sleeping           3/4  17 Laundering clothes (eg washing, ironing, folding)    4/4  18 Opening a jar         3/4  19 Throwing a ball         3/4  20 Carrying a small suitcase with your affected limb    3/4    Minimum Level of Detectable Change (90% Confidence): 9 points SCORE: 63/80    Patient reports 78.75% ability on the Upper Extremity Functional Scale.      TREATMENT     Home Exercises and Patient Education Provided    Education provided:   - Patient educated on the impairments noted above and the effects of physical therapy intervention to improve overall condition and QOL. Provided education on being able to return to working out at the gym but avoiding motions that causes moderate to severe pain. Pt educated that his pectorals are the sources of most of his pain currently so exercises that contract or cause a stretching force to this muscle may cause discomfort/pain with good understanding noted. Pt educated to decreased the weight of which he is lifting for now to trial what his muscles can handle in the gym with the amount of pain he is experiencing with good understanding noted. Pt educated to perform exercises provided in HEP daily for maximal functional improvements.     Written Home Exercises Provided: yes.  Exercises were reviewed and Mitchel was able to demonstrate them prior to the end of the session.  Mitchel demonstrated good  understanding of the education provided.     See EMR under Patient Instructions for exercises provided 9/23/2019.    Assessment   Oscar is a 44 y.o. male referred to outpatient Physical Therapy with a medical diagnosis of Instability of right shoulder joint. Pt presents with decreased and impaired mobility/ROM, decreased strength, decreased endurance, decreased muscle length and poor posture affecting pt's ability to perform [employer] and  related work activities as well as performing hobbies such as rock climbing and Judo. Pt reports decreased QoL and impaired functional  independence as he has been unable to perform physical activity since June.     Pt prognosis is Good.   Pt will benefit from skilled outpatient Physical Therapy to address the deficits stated above and in the chart below, provide pt/family education, and to maximize pt's level of independence.     Plan of care discussed with patient: Yes  Pt's spiritual, cultural and educational needs considered and patient is agreeable to the plan of care and goals as stated below:     Anticipated Barriers for therapy: insurance coverage    Medical Necessity is demonstrated by the following  History  Co-morbidities and personal factors that may impact the plan of care Co-morbidities:   none    Personal Factors:   no deficits     low   Examination  Body Structures and Functions, activity limitations and participation restrictions that may impact the plan of care Body Regions:   upper extremities    Body Systems:    ROM  strength  gross coordinated movement  motor control  motor learning    Participation Restrictions:   Pain with the above described activities    Activity limitations:   Learning and applying knowledge  no deficits    General Tasks and Commands  no deficits    Communication  no deficits    Mobility  lifting and carrying objects    Self care  dressing    Domestic Life  doing house work (cleaning house, washing dishes, laundry)  assisting others    Interactions/Relationships  no deficits    Life Areas  no deficits    Community and Social Life  community life  recreation and leisure         low   Clinical Presentation stable and uncomplicated low   Decision Making/ Complexity Score: low     Goals:  Short Term Goals:  4 weeks   1. Pain: Pt will demonstrate improved pain by reports of less than or equal to 4/10 worst pain on the verbal rating scale in order to progress toward maximal functional ability and improve QOL.   2. Function: Patient will demonstrate improved function as indicated by a score of greater than or  equal to 85% on the Upper Extremity Functional Scale   3. Mobility: Patient will improve AROM to 50% of stated goals, listed in objective measures above, in order to progress towards independence with functional activities.    4. Strength: Patient will improve strength to 50% of stated goals, listed in objective measures above, in order to progress towards independence with functional activities.    5. HEP: Patient will demonstrate independence with HEP in order to progress toward functional independence.   6. Patient will report being able to work out at the gym with modifications provided with 3/10 pain or less to continue improving strength and endurance to return to prior level of function.      Long Term Goals:  8 weeks   1. Pain: Pt will demonstrate improved pain by reports of less than or equal to 1/10 worst pain on the verbal rating scale in order to progress toward maximal functional ability and improve QOL.     2. Function: Patient will demonstrate improved function as indicated by a score of greater than or equal to 95% on the Upper Extremity Functional Scale   3. Mobility: Patient will improve AROM to stated goals, listed in objective measures above, in order to return to maximal functional potential and improve quality of life.   4. Strength: Patient will improve strength to stated goals, listed in objective measures above, in order to improve functional independence and quality of life.   5. Patient will be able to return to all work out activities without pain or dysfunction to improve pt's ability to perform work tasks for both jobs and improved overall QoL.    7. Patient will report being able to perform Judo for 1 week with 1/10 pain of less to improved overall quality of life and return to PLOF.        Plan   Plan of care Certification: 9/23/2019 to 11/22/2019.    Outpatient Physical Therapy 2 times weekly for 8 weeks to include the following interventions: Electrical Stimulation unattended,  Manual Therapy, Moist Heat/ Ice, Neuromuscular Re-ed, Patient Education, Self Care, Therapeutic Activites and Therapeutic Exercise. manual therapy, therapeutic exercise, functional activities, modalities, and patient education.    Thank you for this referral.    These services are reasonable and necessary for the conditions set forth above while under my care.    Syeda Stockton, PT, DPT

## 2019-09-24 PROBLEM — R68.89 DECREASED STRENGTH, ENDURANCE, AND MOBILITY: Status: ACTIVE | Noted: 2019-09-24

## 2019-09-24 PROBLEM — R29.3 POOR POSTURE: Status: ACTIVE | Noted: 2019-09-24

## 2019-09-24 PROBLEM — Z74.09 DECREASED STRENGTH, ENDURANCE, AND MOBILITY: Status: ACTIVE | Noted: 2019-09-24

## 2019-09-24 PROBLEM — R53.1 DECREASED STRENGTH, ENDURANCE, AND MOBILITY: Status: ACTIVE | Noted: 2019-09-24

## 2019-11-15 ENCOUNTER — TELEPHONE (OUTPATIENT)
Dept: ORTHOPEDICS | Facility: CLINIC | Age: 44
End: 2019-11-15

## 2019-11-15 NOTE — TELEPHONE ENCOUNTER
----- Message from Winnie Alvarado sent at 11/15/2019 10:41 AM CST -----  Contact: Staff Sgt. Gómez Magaña would like a call back at [phone], Regards to his treatment plan that was fax over 11/7/19.    Thanks  Td

## 2019-11-15 NOTE — TELEPHONE ENCOUNTER
Attempted to contact Staff Sgt Mgaaña. I was transferred and no one picked up. I was unable to leave a voicemail as I did not have his extension.     ==  Spoke lissa Boykin. She advised that I contact Staff Sgt Magaña at [phone]. I attempted to contact Gómez but I was unable to leave a voicemail as I did not have an extension to enable me to leave a voicemail.

## 2019-11-20 ENCOUNTER — TELEPHONE (OUTPATIENT)
Dept: SPORTS MEDICINE | Facility: CLINIC | Age: 44
End: 2019-11-20

## 2019-11-20 NOTE — TELEPHONE ENCOUNTER
Called sergeant Magaña to present her with Account Services number [phone].          ----- Message from Sandra Cornell sent at 11/20/2019  4:08 PM CST -----  Contact: staff sergeant magaña-eunice traylor  needs call back to discuss billing issues..[phone] or [phone]

## 2020-03-23 ENCOUNTER — DOCUMENTATION ONLY (OUTPATIENT)
Dept: REHABILITATION | Facility: HOSPITAL | Age: 45
End: 2020-03-23

## 2020-03-23 NOTE — PROGRESS NOTES
Outpatient Therapy Discharge Summary     Name: Oscar Lemus  [facility] Number: [id number]    Therapy Diagnosis:        Encounter Diagnoses   Name Primary?    Decreased strength, endurance, and mobility      Poor posture        Physician: Bon Brock, *     Physician Orders: PT Eval and Treat  Medical Diagnosis from Referral: Instability of right shoulder joint  Evaluation Date: 9/23/2019    Date of Last visit: 9/23/2019  Total Visits Received: 1  Cancelled Visits: All visits after evaluation required cancellation due to insurance denial.   No Show Visits: 0    Assessment    Goals:     Patient was unable to return to physical therapy following initial evaluation due to insurance denial for this facility. Patient's goals were not met due to this.     Short Term Goals:  4 weeks   1. Pain: Pt will demonstrate improved pain by reports of less than or equal to 4/10 worst pain on the verbal rating scale in order to progress toward maximal functional ability and improve QOL.   2. Function: Patient will demonstrate improved function as indicated by a score of greater than or equal to 85% on the Upper Extremity Functional Scale   3. Mobility: Patient will improve AROM to 50% of stated goals, listed in objective measures above, in order to progress towards independence with functional activities.    4. Strength: Patient will improve strength to 50% of stated goals, listed in objective measures above, in order to progress towards independence with functional activities.    5. HEP: Patient will demonstrate independence with HEP in order to progress toward functional independence.   6. Patient will report being able to work out at the gym with modifications provided with 3/10 pain or less to continue improving strength and endurance to return to prior level of function.       Long Term Goals:  8 weeks   1. Pain: Pt will demonstrate improved pain by reports of less than or equal to 1/10 worst pain on the verbal  rating scale in order to progress toward maximal functional ability and improve QOL.     2. Function: Patient will demonstrate improved function as indicated by a score of greater than or equal to 95% on the Upper Extremity Functional Scale   3. Mobility: Patient will improve AROM to stated goals, listed in objective measures above, in order to return to maximal functional potential and improve quality of life.   4. Strength: Patient will improve strength to stated goals, listed in objective measures above, in order to improve functional independence and quality of life.   5. Patient will be able to return to all work out activities without pain or dysfunction to improve pt's ability to perform work tasks for both jobs and improved overall QoL.    7. Patient will report being able to perform Judo for 1 week with 1/10 pain of less to improved overall quality of life and return to PLOF.        Discharge reason: Other:  Patient's insurance did not approve this location for physical therapy.     Plan   This patient is discharged from Physical Therapy      Syeda Stockton, PT, DPT

## 2021-04-29 ENCOUNTER — PATIENT MESSAGE (OUTPATIENT)
Dept: RESEARCH | Facility: HOSPITAL | Age: 46
End: 2021-04-29

## 2021-12-01 NOTE — TELEPHONE ENCOUNTER
----- Message from Kt Caba sent at 9/16/2019  9:56 AM CDT -----  Contact: Sandra Magaña ([facility])  Caller is requesting a referrals for the pt in order to get these appt approved. Please give Sandra a call at [phone]. She's left several messages and no one has returned her call.       MRI referral   Ortho Referral  Physical Therapy referral    Patient baseline mental status